# Patient Record
(demographics unavailable — no encounter records)

---

## 2025-06-13 NOTE — REASON FOR VISIT
[Follow Up] : a follow up visit [Patient] : patient [Mother] : mother [FreeTextEntry1] : intoeing and R knee pain

## 2025-06-13 NOTE — DATA REVIEWED
[de-identified] : XR right knee 3 views performed today 6/13/25: No acute fracture or dislocation. Mild fragmentation about the tibial tuberosity.

## 2025-06-13 NOTE — PHYSICAL EXAM
[FreeTextEntry1] : Gait: Presents ambulating independently without signs of antalgia.  Good coordination and balance noted. GENERAL: alert, cooperative, in NAD SKIN: The skin is intact, warm, pink and dry over the area examined. EYES: Normal conjunctiva, normal eyelids and pupils were equal and round. ENT: normal ears, normal nose and normal lips. CARDIOVASCULAR: brisk capillary refill, but no peripheral edema. RESPIRATORY: The patient is in no apparent respiratory distress. They're taking full deep breaths without use of accessory muscles or evidence of audible wheezes or stridor without the use of a stethoscope. Normal respiratory effort. ABDOMEN: not examined  focused exam of the LE He intoes bilaterally when he walks, otherwise his gait pattern is normal of his age. No obvious clinical orthopedic deformities. No clinical leg length discrepancies. No swelling, deformities or bruises of the lower extremities Full flexion and extension of the hips, abduction with the hips in flexion is 60 bilaterally. Internal rotation of the hips 70 on the right, 70 on the left, external rotation of the hips and 40 on the right, 40 on the left.Thigh-foot angles approximately 0 bilaterally. Both patellas are properly located. Full flexion and extension of the knees, no locking. Meniscal maneuvers are negative. Both feet are well aligned, they're flexible, no calluses. No signs of metatarsus adductus. No cavus. No toe deformities.  Right knee No bony deformities, signs of trauma, or erythema noted. No visible effusion, muscle atrophy or asymmetry. No tenderness in bony prominences or soft tissue.  No joint line, MCL, LCL,patellar tendon, or quadriceps tendon tenderness.  Tenderness about the tibial tuberosity  Full active and passive range of motion of the knee. Toes are warm, pink, and moving freely.  Strength is 5/5. Sensation is intact to light touch distally. Patellar reflex +2 B/L. Brisk capillary refill in all toes. No joint laxity palpable. Joint is stable with varus and valgus stress. Negative Lachman test, negative anterior and posterior drawer with solid end point.  Negative [facility] test. Negative patellar grind and patellar apprehension test.  No abnormal findings on ankle or hip examination. No effusion of R knee

## 2025-06-13 NOTE — ASSESSMENT
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 The natural history of femoral anteversion discussed. Father was reassured that In-toeing resolves spontaneously as the child grows.Even if in-toeing does not completely resolve, long-term functional problems are rare. Discussed at length with parents that interventions such as special shoes, orthotics are now ineffective. With regards to right knee, recommendation at this time would be NSAIDs, rest, ice, activity modification. We also recommended OTC patella tendon brace to decrease tension on the apophysitis and quadriceps stretching. He will f/u on prn basis. All questions answered. Family and patient verbalize understanding of the plan.   IShirley PA-C have acted as scribe and documented the above for Dr. Sun

## 2025-06-13 NOTE — PHYSICAL EXAM
[FreeTextEntry1] : Gait: Presents ambulating independently without signs of antalgia.  Good coordination and balance noted. GENERAL: alert, cooperative, in NAD SKIN: The skin is intact, warm, pink and dry over the area examined. EYES: Normal conjunctiva, normal eyelids and pupils were equal and round. ENT: normal ears, normal nose and normal lips. CARDIOVASCULAR: brisk capillary refill, but no peripheral edema. RESPIRATORY: The patient is in no apparent respiratory distress. They're taking full deep breaths without use of accessory muscles or evidence of audible wheezes or stridor without the use of a stethoscope. Normal respiratory effort. ABDOMEN: not examined  focused exam of the LE He intoes bilaterally when he walks, otherwise his gait pattern is normal of his age. No obvious clinical orthopedic deformities. No clinical leg length discrepancies. No swelling, deformities or bruises of the lower extremities Full flexion and extension of the hips, abduction with the hips in flexion is 60 bilaterally. Internal rotation of the hips 70 on the right, 70 on the left, external rotation of the hips and 40 on the right, 40 on the left.Thigh-foot angles approximately 0 bilaterally. Both patellas are properly located. Full flexion and extension of the knees, no locking. Meniscal maneuvers are negative. Both feet are well aligned, they're flexible, no calluses. No signs of metatarsus adductus. No cavus. No toe deformities.  Right knee No bony deformities, signs of trauma, or erythema noted. No visible effusion, muscle atrophy or asymmetry. No tenderness in bony prominences or soft tissue.  No joint line, MCL, LCL,patellar tendon, or quadriceps tendon tenderness.  Tenderness about the tibial tuberosity  Full active and passive range of motion of the knee. Toes are warm, pink, and moving freely.  Strength is 5/5. Sensation is intact to light touch distally. Patellar reflex +2 B/L. Brisk capillary refill in all toes. No joint laxity palpable. Joint is stable with varus and valgus stress. Negative Lachman test, negative anterior and posterior drawer with solid end point.  Negative [facility] test. Negative patellar grind and patellar apprehension test.  No abnormal findings on ankle or hip examination. No effusion of R knee

## 2025-06-13 NOTE — DATA REVIEWED
[de-identified] : XR right knee 3 views performed today 6/13/25: No acute fracture or dislocation. Mild fragmentation about the tibial tuberosity.

## 2025-06-13 NOTE — REVIEW OF SYSTEMS
[Nl] : Musculoskeletal [No Acute Changes] : No acute changes since previous visit [Muscle Aches] : muscle aches

## 2025-06-13 NOTE — HISTORY OF PRESENT ILLNESS
[FreeTextEntry1] : Jessica is a 12 year old male who presents with his father for follow up of intoeing and new R knee pain. Last seen Sept 2022 and was diagnosed with femoral anteversion. Father reports that he continues to walk with his feet turned inwards.  No significant improvement has been noted since the last visit. He c/o R knee pain localized to the anterior aspect for the past 2 months. The pain is intermittent and usually occurs during soccer. Denies any prior injury, trauma or fall. Denies any need for pain medication at home. Denies any radiating pain, numbness or any tingling sensation. Here for orthopedic evaluation and management.   The patient's HPI was reviewed thoroughly with patient and parent. The patient's parent has acted as an independent historian regarding the above information due to the unreliable nature of the history obtained from the patient.